# Patient Record
Sex: FEMALE | Race: BLACK OR AFRICAN AMERICAN | NOT HISPANIC OR LATINO | URBAN - METROPOLITAN AREA
[De-identification: names, ages, dates, MRNs, and addresses within clinical notes are randomized per-mention and may not be internally consistent; named-entity substitution may affect disease eponyms.]

---

## 2019-05-11 ENCOUNTER — EMERGENCY (EMERGENCY)
Facility: HOSPITAL | Age: 39
LOS: 0 days | Discharge: HOME | End: 2019-05-11
Payer: COMMERCIAL

## 2019-05-11 VITALS
DIASTOLIC BLOOD PRESSURE: 74 MMHG | SYSTOLIC BLOOD PRESSURE: 122 MMHG | OXYGEN SATURATION: 100 % | TEMPERATURE: 99 F | HEIGHT: 66 IN | RESPIRATION RATE: 18 BRPM | WEIGHT: 229.94 LBS | HEART RATE: 76 BPM
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
## 2019-05-11 DIAGNOSIS — S69.92XA UNSPECIFIED INJURY OF LEFT WRIST, HAND AND FINGER(S), INITIAL ENCOUNTER: ICD-10-CM
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
## 2019-05-11 DIAGNOSIS — S60.922A UNSPECIFIED SUPERFICIAL INJURY OF LEFT HAND, INITIAL ENCOUNTER: ICD-10-CM

## 2019-05-11 LAB
ALBUMIN SERPL ELPH-MCNC: 3.7 G/DL — SIGNIFICANT CHANGE UP (ref 3.3–5)
ALP SERPL-CCNC: 83 U/L — SIGNIFICANT CHANGE UP (ref 40–120)
ALT FLD-CCNC: 20 U/L — SIGNIFICANT CHANGE UP (ref 12–78)
ANION GAP SERPL CALC-SCNC: 9 MMOL/L — SIGNIFICANT CHANGE UP (ref 5–17)
AST SERPL-CCNC: 18 U/L — SIGNIFICANT CHANGE UP (ref 15–37)
BILIRUB SERPL-MCNC: 0.3 MG/DL — SIGNIFICANT CHANGE UP (ref 0.2–1.2)
BUN SERPL-MCNC: 11 MG/DL — SIGNIFICANT CHANGE UP (ref 7–23)
CALCIUM SERPL-MCNC: 9 MG/DL — SIGNIFICANT CHANGE UP (ref 8.5–10.1)
CHLORIDE SERPL-SCNC: 108 MMOL/L — SIGNIFICANT CHANGE UP (ref 96–108)
CO2 SERPL-SCNC: 25 MMOL/L — SIGNIFICANT CHANGE UP (ref 22–31)
CREAT SERPL-MCNC: 0.87 MG/DL — SIGNIFICANT CHANGE UP (ref 0.5–1.3)
GLUCOSE SERPL-MCNC: 78 MG/DL — SIGNIFICANT CHANGE UP (ref 70–99)
HCT VFR BLD CALC: 40.8 % — SIGNIFICANT CHANGE UP (ref 34.5–45)
HGB BLD-MCNC: 13.3 G/DL — SIGNIFICANT CHANGE UP (ref 11.5–15.5)
HIV 1 & 2 AB SERPL IA.RAPID: SIGNIFICANT CHANGE UP
MCHC RBC-ENTMCNC: 27 PG — SIGNIFICANT CHANGE UP (ref 27–34)
MCHC RBC-ENTMCNC: 32.6 GM/DL — SIGNIFICANT CHANGE UP (ref 32–36)
MCV RBC AUTO: 82.9 FL — SIGNIFICANT CHANGE UP (ref 80–100)
NRBC # BLD: 0 /100 WBCS — SIGNIFICANT CHANGE UP (ref 0–0)
PLATELET # BLD AUTO: 366 K/UL — SIGNIFICANT CHANGE UP (ref 150–400)
POTASSIUM SERPL-MCNC: 3.7 MMOL/L — SIGNIFICANT CHANGE UP (ref 3.5–5.3)
POTASSIUM SERPL-SCNC: 3.7 MMOL/L — SIGNIFICANT CHANGE UP (ref 3.5–5.3)
PROT SERPL-MCNC: 7.3 GM/DL — SIGNIFICANT CHANGE UP (ref 6–8.3)
RBC # BLD: 4.92 M/UL — SIGNIFICANT CHANGE UP (ref 3.8–5.2)
RBC # FLD: 14.5 % — SIGNIFICANT CHANGE UP (ref 10.3–14.5)
SODIUM SERPL-SCNC: 142 MMOL/L — SIGNIFICANT CHANGE UP (ref 135–145)
WBC # BLD: 6.87 K/UL — SIGNIFICANT CHANGE UP (ref 3.8–10.5)
WBC # FLD AUTO: 6.87 K/UL — SIGNIFICANT CHANGE UP (ref 3.8–10.5)

## 2019-05-11 PROCEDURE — 99283 EMERGENCY DEPT VISIT LOW MDM: CPT

## 2019-05-11 RX ORDER — EMTRICITABINE AND TENOFOVIR DISOPROXIL FUMARATE 200; 300 MG/1; MG/1
1 TABLET, FILM COATED ORAL
Qty: 30 | Refills: 0
Start: 2019-05-11 | End: 2019-06-09

## 2019-05-11 RX ORDER — RALTEGRAVIR 400 MG/1
1 TABLET, FILM COATED ORAL
Qty: 60 | Refills: 0
Start: 2019-05-11 | End: 2019-06-09

## 2019-05-11 RX ORDER — EMTRICITABINE AND TENOFOVIR DISOPROXIL FUMARATE 200; 300 MG/1; MG/1
1 TABLET, FILM COATED ORAL ONCE
Refills: 0 | Status: COMPLETED | OUTPATIENT
Start: 2019-05-11 | End: 2019-05-11

## 2019-05-11 RX ORDER — RALTEGRAVIR 400 MG/1
400 TABLET, FILM COATED ORAL ONCE
Refills: 0 | Status: COMPLETED | OUTPATIENT
Start: 2019-05-11 | End: 2019-05-11

## 2019-05-11 RX ADMIN — RALTEGRAVIR 400 MILLIGRAM(S): 400 TABLET, FILM COATED ORAL at 20:57

## 2019-05-11 RX ADMIN — EMTRICITABINE AND TENOFOVIR DISOPROXIL FUMARATE 1 TABLET(S): 200; 300 TABLET, FILM COATED ORAL at 20:57

## 2019-05-11 NOTE — ED PROVIDER NOTE - OBJECTIVE STATEMENT
L thumb needlestick 3 days ago at work 38 y/o c/o L thumb needlestick 3 days ago at work. Pt is a PA, was given perivertebral injection for pain management when the pt jumped and her L thumb came in contact with the used needle. Pt reports no gross blood on syringe, believes it was a 25 g needle. + small amt of blood on finger when gloves was removed. Reports cleaning out the wound. Was sent in by her supervisor for eval. Pt reports that the source pt was contacted and is being tested. Reports that the source pt has no known h/o HIV, hep, etc.

## 2019-05-11 NOTE — ED ADULT NURSE NOTE - OBJECTIVE STATEMENT
patient A&Ox3 in no acute distress , patient got a needle stick on Wednesday at work , she was send for evaluation by her supervisor, patient denied any pain or disc at this time,

## 2019-05-11 NOTE — ED PROVIDER NOTE - CLINICAL SUMMARY MEDICAL DECISION MAKING FREE TEXT BOX
pt sustained needlestick injury to L thumb with used needle at work a few days ago. Pt was sent for eval. Pt consented to labs, and after discussing risk and benefits, wanted PEP. discussed HIV testing and repeat testing for HIV and hep. Pt elected to f/u results with her DR or throught medical records, did not want to wait for results. Follow up with your regular Dr or clinic above in 2 days. Return to ER if you feel worse, or have new symptoms.

## 2019-05-11 NOTE — ED ADULT TRIAGE NOTE - CHIEF COMPLAINT QUOTE
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

## 2019-05-11 NOTE — ED PROVIDER NOTE - PROGRESS NOTE DETAILS
Pt did not want to wait for lab results. discussed HIV testing and confidentially earlier, will f/u her own results with Medical records or her PMD.

## 2019-05-12 LAB
CHOLEST SERPL-MCNC: 165 MG/DL — SIGNIFICANT CHANGE UP (ref 10–199)
HBV CORE AB SER-ACNC: SIGNIFICANT CHANGE UP
HDLC SERPL-MCNC: 41 MG/DL — LOW
LIPID PNL WITH DIRECT LDL SERPL: 103 MG/DL — HIGH
TOTAL CHOLESTEROL/HDL RATIO MEASUREMENT: 4 RATIO — SIGNIFICANT CHANGE UP (ref 3.3–7.1)
TRIGL SERPL-MCNC: 106 MG/DL — SIGNIFICANT CHANGE UP (ref 10–149)

## 2019-05-22 LAB
HCV AB S/CO SERPL IA: 0.06 S/CO — SIGNIFICANT CHANGE UP (ref 0–0.99)
HCV AB SERPL-IMP: SIGNIFICANT CHANGE UP

## 2020-07-20 PROBLEM — J45.909 UNSPECIFIED ASTHMA, UNCOMPLICATED: Chronic | Status: ACTIVE | Noted: 2019-05-11

## 2020-08-19 PROBLEM — Z00.00 ENCOUNTER FOR PREVENTIVE HEALTH EXAMINATION: Status: ACTIVE | Noted: 2020-08-19

## 2020-09-12 ENCOUNTER — RESULT REVIEW (OUTPATIENT)
Age: 40
End: 2020-09-12

## 2020-09-12 ENCOUNTER — APPOINTMENT (OUTPATIENT)
Dept: ULTRASOUND IMAGING | Facility: IMAGING CENTER | Age: 40
End: 2020-09-12
Payer: COMMERCIAL

## 2020-09-12 ENCOUNTER — OUTPATIENT (OUTPATIENT)
Dept: OUTPATIENT SERVICES | Facility: HOSPITAL | Age: 40
LOS: 1 days | End: 2020-09-12
Payer: COMMERCIAL

## 2020-09-12 DIAGNOSIS — Z00.8 ENCOUNTER FOR OTHER GENERAL EXAMINATION: ICD-10-CM

## 2020-09-12 PROCEDURE — 88305 TISSUE EXAM BY PATHOLOGIST: CPT | Mod: 26

## 2020-09-12 PROCEDURE — 19083 BX BREAST 1ST LESION US IMAG: CPT | Mod: LT

## 2020-09-12 PROCEDURE — 77066 DX MAMMO INCL CAD BI: CPT

## 2020-09-12 PROCEDURE — A4648: CPT

## 2020-09-12 PROCEDURE — 77066 DX MAMMO INCL CAD BI: CPT | Mod: 26

## 2020-09-12 PROCEDURE — 19084 BX BREAST ADD LESION US IMAG: CPT

## 2020-09-12 PROCEDURE — 88305 TISSUE EXAM BY PATHOLOGIST: CPT

## 2020-09-12 PROCEDURE — 19084 BX BREAST ADD LESION US IMAG: CPT | Mod: RT

## 2020-09-12 PROCEDURE — 19083 BX BREAST 1ST LESION US IMAG: CPT

## 2020-09-14 LAB — SURGICAL PATHOLOGY STUDY: SIGNIFICANT CHANGE UP

## 2022-10-12 ENCOUNTER — RESULT REVIEW (OUTPATIENT)
Age: 42
End: 2022-10-12

## 2022-10-14 ENCOUNTER — ASOB RESULT (OUTPATIENT)
Age: 42
End: 2022-10-14

## 2022-10-14 ENCOUNTER — APPOINTMENT (OUTPATIENT)
Dept: ANTEPARTUM | Facility: CLINIC | Age: 42
End: 2022-10-14

## 2022-10-14 DIAGNOSIS — O35.1XX0 MATERNAL CARE FOR (SUSPECTED) CHROMOSOMAL ABNORMALITY IN FETUS, NOT APPLICABLE OR UNSPECIFIED: ICD-10-CM

## 2022-10-14 PROCEDURE — 36415 COLL VENOUS BLD VENIPUNCTURE: CPT

## 2022-10-14 PROCEDURE — 76813 OB US NUCHAL MEAS 1 GEST: CPT

## 2022-10-17 LAB
ADDITIONAL US: NORMAL
COMMENTS: AFP: NORMAL
CRL SCAN TWIN B: NORMAL
CRL SCAN: NORMAL
CROWN RUMP LENGTH TWIN B: NORMAL
CROWN RUMP LENGTH: 48.8 MM
DOWN SYNDROME AGE RISK: NORMAL
DOWN SYNDROME INTERPRETATION: NORMAL
DOWN SYNDROME SCREENING RISK: NORMAL
GEST. AGE ON COLLECTION DATE: 11.4 WEEKS
HCG MOM: 2.26
HCG VALUE: 193.2 IU/ML
MATERNAL AGE AT EDD: 44.2 YR
NOTE: AFP: NORMAL
NT MOM TWIN B: NORMAL
NT TWIN B: NORMAL
NUCHAL TRANSLUCENCY (NT): 1 MM
NUCHAL TRANSLUCENCY MOM: 0.79
NUMBER OF FETUSES: 1
PAPP-A MOM: 1.36
PAPP-A VALUE: 486.5 NG/ML
RACE: NORMAL
RESULTS AFP: NORMAL
SONOGRAPHER ID#: NORMAL
SUBMIT PART 2 SAMPLE USING: NORMAL
TEST RESULTS: AFP: NORMAL
TRISOMY 18 AGE RISK: NORMAL
TRISOMY 18 INTERPRETATION: NORMAL
TRISOMY 18 SCREENING RISK: NORMAL
WEIGHT AFP: 235 LBS

## 2022-10-19 ENCOUNTER — APPOINTMENT (OUTPATIENT)
Dept: OBGYN | Facility: CLINIC | Age: 42
End: 2022-10-19

## 2022-12-14 ENCOUNTER — APPOINTMENT (OUTPATIENT)
Dept: ANTEPARTUM | Facility: CLINIC | Age: 42
End: 2022-12-14

## 2022-12-14 ENCOUNTER — ASOB RESULT (OUTPATIENT)
Age: 42
End: 2022-12-14

## 2022-12-14 PROCEDURE — 76817 TRANSVAGINAL US OBSTETRIC: CPT

## 2022-12-14 PROCEDURE — 76811 OB US DETAILED SNGL FETUS: CPT

## 2022-12-28 ENCOUNTER — ASOB RESULT (OUTPATIENT)
Age: 42
End: 2022-12-28

## 2022-12-28 ENCOUNTER — APPOINTMENT (OUTPATIENT)
Dept: ANTEPARTUM | Facility: CLINIC | Age: 42
End: 2022-12-28
Payer: COMMERCIAL

## 2022-12-28 PROCEDURE — 76816 OB US FOLLOW-UP PER FETUS: CPT

## 2023-02-22 ENCOUNTER — ASOB RESULT (OUTPATIENT)
Age: 43
End: 2023-02-22

## 2023-02-22 ENCOUNTER — APPOINTMENT (OUTPATIENT)
Dept: ANTEPARTUM | Facility: CLINIC | Age: 43
End: 2023-02-22
Payer: COMMERCIAL

## 2023-02-22 PROCEDURE — 76819 FETAL BIOPHYS PROFIL W/O NST: CPT

## 2023-02-22 PROCEDURE — 76816 OB US FOLLOW-UP PER FETUS: CPT

## 2023-03-29 ENCOUNTER — APPOINTMENT (OUTPATIENT)
Dept: ANTEPARTUM | Facility: CLINIC | Age: 43
End: 2023-03-29
Payer: COMMERCIAL

## 2023-03-29 ENCOUNTER — ASOB RESULT (OUTPATIENT)
Age: 43
End: 2023-03-29

## 2023-03-29 PROCEDURE — 76816 OB US FOLLOW-UP PER FETUS: CPT

## 2023-03-29 PROCEDURE — ZZZZZ: CPT

## 2023-03-29 PROCEDURE — 76818 FETAL BIOPHYS PROFILE W/NST: CPT

## 2023-04-04 ENCOUNTER — ASOB RESULT (OUTPATIENT)
Age: 43
End: 2023-04-04

## 2023-04-04 ENCOUNTER — APPOINTMENT (OUTPATIENT)
Dept: ANTEPARTUM | Facility: CLINIC | Age: 43
End: 2023-04-04

## 2023-04-04 ENCOUNTER — APPOINTMENT (OUTPATIENT)
Dept: ANTEPARTUM | Facility: CLINIC | Age: 43
End: 2023-04-04
Payer: COMMERCIAL

## 2023-04-04 ENCOUNTER — OUTPATIENT (OUTPATIENT)
Dept: INPATIENT UNIT | Facility: HOSPITAL | Age: 43
LOS: 1 days | Discharge: HOME | End: 2023-04-04
Payer: COMMERCIAL

## 2023-04-04 VITALS — TEMPERATURE: 99 F

## 2023-04-04 VITALS — HEART RATE: 90 BPM | OXYGEN SATURATION: 100 %

## 2023-04-04 DIAGNOSIS — O26.899 OTHER SPECIFIED PREGNANCY RELATED CONDITIONS, UNSPECIFIED TRIMESTER: ICD-10-CM

## 2023-04-04 LAB
AMNISURE ROM (RUPTURE OF MEMBRANES): NEGATIVE — SIGNIFICANT CHANGE UP
BASOPHILS # BLD AUTO: 0.04 K/UL — SIGNIFICANT CHANGE UP (ref 0–0.2)
BASOPHILS NFR BLD AUTO: 0.4 % — SIGNIFICANT CHANGE UP (ref 0–2)
BLD GP AB SCN SERPL QL: NEGATIVE — SIGNIFICANT CHANGE UP
EOSINOPHIL # BLD AUTO: 0.21 K/UL — SIGNIFICANT CHANGE UP (ref 0–0.5)
EOSINOPHIL NFR BLD AUTO: 2.3 % — SIGNIFICANT CHANGE UP (ref 0–6)
HCT VFR BLD CALC: 38.7 % — SIGNIFICANT CHANGE UP (ref 34.5–45)
HGB BLD-MCNC: 12.5 G/DL — SIGNIFICANT CHANGE UP (ref 11.5–15.5)
IANC: 7.08 K/UL — SIGNIFICANT CHANGE UP (ref 1.8–7.4)
IMM GRANULOCYTES NFR BLD AUTO: 0.6 % — SIGNIFICANT CHANGE UP (ref 0–0.9)
LYMPHOCYTES # BLD AUTO: 1.1 K/UL — SIGNIFICANT CHANGE UP (ref 1–3.3)
LYMPHOCYTES # BLD AUTO: 11.8 % — LOW (ref 13–44)
MCHC RBC-ENTMCNC: 26 PG — LOW (ref 27–34)
MCHC RBC-ENTMCNC: 32.3 GM/DL — SIGNIFICANT CHANGE UP (ref 32–36)
MCV RBC AUTO: 80.6 FL — SIGNIFICANT CHANGE UP (ref 80–100)
MONOCYTES # BLD AUTO: 0.83 K/UL — SIGNIFICANT CHANGE UP (ref 0–0.9)
MONOCYTES NFR BLD AUTO: 8.9 % — SIGNIFICANT CHANGE UP (ref 2–14)
NEUTROPHILS # BLD AUTO: 7.08 K/UL — SIGNIFICANT CHANGE UP (ref 1.8–7.4)
NEUTROPHILS NFR BLD AUTO: 76 % — SIGNIFICANT CHANGE UP (ref 43–77)
NRBC # BLD: 0 /100 WBCS — SIGNIFICANT CHANGE UP (ref 0–0)
NRBC # FLD: 0 K/UL — SIGNIFICANT CHANGE UP (ref 0–0)
PLATELET # BLD AUTO: 247 K/UL — SIGNIFICANT CHANGE UP (ref 150–400)
RBC # BLD: 4.8 M/UL — SIGNIFICANT CHANGE UP (ref 3.8–5.2)
RBC # FLD: 17 % — HIGH (ref 10.3–14.5)
RH IG SCN BLD-IMP: POSITIVE — SIGNIFICANT CHANGE UP
SARS-COV-2 RNA SPEC QL NAA+PROBE: SIGNIFICANT CHANGE UP
WBC # BLD: 9.32 K/UL — SIGNIFICANT CHANGE UP (ref 3.8–10.5)
WBC # FLD AUTO: 9.32 K/UL — SIGNIFICANT CHANGE UP (ref 3.8–10.5)

## 2023-04-04 PROCEDURE — 76818 FETAL BIOPHYS PROFILE W/NST: CPT

## 2023-04-04 PROCEDURE — 83986 ASSAY PH BODY FLUID NOS: CPT | Mod: QW

## 2023-04-04 PROCEDURE — 99221 1ST HOSP IP/OBS SF/LOW 40: CPT | Mod: 25

## 2023-04-04 RX ORDER — ALBUTEROL 90 UG/1
3 AEROSOL, METERED ORAL
Qty: 0 | Refills: 0 | DISCHARGE

## 2023-04-04 RX ORDER — SODIUM CHLORIDE 9 MG/ML
1000 INJECTION, SOLUTION INTRAVENOUS ONCE
Refills: 0 | Status: DISCONTINUED | OUTPATIENT
Start: 2023-04-04 | End: 2023-04-19

## 2023-04-04 RX ORDER — FEXOFENADINE HCL 30 MG
1 TABLET ORAL
Qty: 0 | Refills: 0 | DISCHARGE

## 2023-04-04 NOTE — OB PROVIDER TRIAGE NOTE - NSOBPROVIDERNOTE_OBGYN_ALL_OB_FT
a/p: 43 y.o.  HIRA 2023 @ 37 weeks     1755  Discussed with Dr Ottoniel Israel. Plan for RL 1L IVB x 1, amnisure    AIDAidtim, NP a/p: 43 y.o.  HIRA 2023 @ 37 weeks r/o ROM    6815  Discussed with Dr Ottoniel Israel. Plan for RL 1L IVB x 1, amnisure pending, NPO  plan of care reviewed with patient and patient partner    Isela, NP a/p: 43 y.o.  HIRA 2023 @ 37 weeks r/o ROM    GBS negative 3/27/23    4481  Discussed with Dr Ottoniel Israel. Plan for RL 1L IVB x 1, amnisure pending, NPO  plan of care reviewed with patient and patient partner    Isela, NP a/p: 43 y.o.  HIRA 2023 @ 37 weeks r/o ROM    GBS negative 3/27/23    1145  Discussed with Dr Ottoniel Israel. Plan for RL 1L IVB x 1, amnisure pending, NPO  plan of care reviewed with patient and patient partner      amnisure negative, results reviewed with patient  RL 1L bolus continues    JMidy, NP a/p: 43 y.o.  HIRA 2023 @ 37 weeks r/o ROM    GBS negative 3/27/23    1755  Discussed with Dr Ottoniel Israel. Plan for RL 1L IVB x 1, amnisure pending, NPO  plan of care reviewed with patient and patient partner      amnisure negative, results reviewed with patient  RL 1L bolus continues    JMidy, NP    :   Tracing reviewed by Dr Briggs  Pt is cleared for discharge  Dr Briggs at patient's bedside to discuss follow-ups    Discharge home with instructions  Labor precautions  Pt to monitor fetal kick counts  pt to follow up with OB as instructed  Pt instructed to increase PO hydration  Discharge time:

## 2023-04-04 NOTE — OB PROVIDER TRIAGE NOTE - NS_OBGYNHISTORY_OBGYN_ALL_OB_FT
2013  7lbs F  2017  7lbs M  fibroid uterus 2013  7lbs F  2017  7lbs M  fibroid uterus: right lateral myoma 7 x 5 x 6

## 2023-04-04 NOTE — OB RN TRIAGE NOTE - FALL HARM RISK - UNIVERSAL INTERVENTIONS
Bed in lowest position, wheels locked, appropriate side rails in place/Call bell, personal items and telephone in reach/Instruct patient to call for assistance before getting out of bed or chair/Non-slip footwear when patient is out of bed/[place] to call system/Physically safe environment - no spills, clutter or unnecessary equipment/Purposeful Proactive Rounding/Room/bathroom lighting operational, light cord in reach

## 2023-04-04 NOTE — OB RN TRIAGE NOTE - NSNURSINGINSTR_OBGYN_ALL_OB_FT
Patient discharged home with instructions.  Patient verbalizes understanding. All questions answered.

## 2023-04-04 NOTE — OB PROVIDER TRIAGE NOTE - NSHPPHYSICALEXAM_GEN_ALL_CORE
abdomen soft, nontender ICU Vital Signs Last 24 Hrs  T(C): 37 (04 Apr 2023 16:56), Max: 37.0 (04 Apr 2023 16:52)  T(F): 98.6 (04 Apr 2023 16:56), Max: 98.6 (04 Apr 2023 16:52)  HR: 98 (04 Apr 2023 18:26) (90 - 110)  BP: 124/65 (04 Apr 2023 17:12) (118/68 - 127/65)  BP(mean): --  ABP: --  ABP(mean): --  RR: 16 (04 Apr 2023 16:56) (16 - 16)  SpO2: 100% (04 Apr 2023 18:26) (97% - 100%)      abdomen soft, nontender  taus: sliup, galdino breech presentation, posterior placenta, BPP 8/8, afua 10.26  sse: negative pooling/nitrazine/ferning, amnisure pending  sve: 0/50/-3/I  nst in progress

## 2023-04-04 NOTE — OB PROVIDER TRIAGE NOTE - ADDITIONAL INSTRUCTIONS
Discharge home with instructions  Labor precautions  Pt to monitor fetal kick counts  pt to follow up with OB as instructed  Pt instructed to increase PO hydration

## 2023-04-04 NOTE — OB PROVIDER TRIAGE NOTE - HISTORY OF PRESENT ILLNESS
43 y.o.  HIRA 2023 @ 37 weeks presents from [facility] office for evaluation of decreased afua of 5.5. Pt denies LOF, VB, ctx. States +FM. Fetus is breech presentation and patient is scheduled for pLTCS 2023.    pt states hx +covid 19 . Pt is vaccinated x 2 with moderna covid 19 vaccine and moderna covid 19 booster vaccine x 1.    allergies:  NKDA  medications:  prenatal vitamins  asa 81 mg PO alternating with asa 162 mg PO daily - last dose 162 mg 4/3/23  albuterol prn    med hx:  BMI 40  asthma - mild, intermittent, never hospitalized or intubated, albuterol prn last use 1 yr ago  hypothyroid- no meds    surg hx:  denies    OBGYN hx:  2013  7lbs F  2017  7lbs M  fibroid uterus    psychosocial hx:  anxiety, depression     ETOH/tobacco/illicit drug use:  denies   43 y.o.  HIRA 2023 @ 37 weeks presents from [facility] office for evaluation of decreased afua of 5.5. Pt denies LOF, VB, ctx. States +FM. Fetus is breech presentation and patient is scheduled for pLTCS 2023.    [facility] ultrasound [address]: sliup, bpp , afua 5.5, breech presentation, posterior placenta    pt states hx +covid 19 . Pt is vaccinated x 2 with moderna covid 19 vaccine and moderna covid 19 booster vaccine x 1.    allergies:  NKDA  medications:  prenatal vitamins  asa 81 mg PO alternating with asa 162 mg PO daily - last dose 162 mg 4/3/23  albuterol prn    med hx:  BMI 40  asthma - mild, intermittent, never hospitalized or intubated, albuterol prn last use 1 yr ago  hypothyroid- no meds    surg hx:  denies    OBGYN hx:  2013  7lbs F  2017  7lbs M  fibroid uterus: right lateral myoma 7 x 5 x 6    psychosocial hx:  anxiety, depression     ETOH/tobacco/illicit drug use:  denies

## 2023-04-04 NOTE — CHART NOTE - NSCHARTNOTEFT_GEN_A_CORE
Attending Note     Pt reports good FM, no LOF/VB/CTX  Amnisure neg  Repeat MARQUES 10 and NST reviewed with MFM, will d/c home with f/u in 48 hours   Strict precautions reviewed    R Brigitte HARRISON

## 2023-04-05 LAB
COVID-19 SPIKE DOMAIN AB INTERP: POSITIVE
COVID-19 SPIKE DOMAIN ANTIBODY RESULT: >250 U/ML — HIGH
SARS-COV-2 IGG+IGM SERPL QL IA: >250 U/ML — HIGH
SARS-COV-2 IGG+IGM SERPL QL IA: POSITIVE
T PALLIDUM AB TITR SER: NEGATIVE — SIGNIFICANT CHANGE UP

## 2023-04-06 DIAGNOSIS — O09.523 SUPERVISION OF ELDERLY MULTIGRAVIDA, THIRD TRIMESTER: ICD-10-CM

## 2023-04-06 DIAGNOSIS — O99.283 ENDOCRINE, NUTRITIONAL AND METABOLIC DISEASES COMPLICATING PREGNANCY, THIRD TRIMESTER: ICD-10-CM

## 2023-04-06 DIAGNOSIS — F41.9 ANXIETY DISORDER, UNSPECIFIED: ICD-10-CM

## 2023-04-06 DIAGNOSIS — Z86.16 PERSONAL HISTORY OF COVID-19: ICD-10-CM

## 2023-04-06 DIAGNOSIS — O99.513 DISEASES OF THE RESPIRATORY SYSTEM COMPLICATING PREGNANCY, THIRD TRIMESTER: ICD-10-CM

## 2023-04-06 DIAGNOSIS — Z20.822 CONTACT WITH AND (SUSPECTED) EXPOSURE TO COVID-19: ICD-10-CM

## 2023-04-06 DIAGNOSIS — Z03.71 ENCOUNTER FOR SUSPECTED PROBLEM WITH AMNIOTIC CAVITY AND MEMBRANE RULED OUT: ICD-10-CM

## 2023-04-06 DIAGNOSIS — Z3A.37 37 WEEKS GESTATION OF PREGNANCY: ICD-10-CM

## 2023-04-06 DIAGNOSIS — O34.13 MATERNAL CARE FOR BENIGN TUMOR OF CORPUS UTERI, THIRD TRIMESTER: ICD-10-CM

## 2023-04-06 DIAGNOSIS — J45.20 MILD INTERMITTENT ASTHMA, UNCOMPLICATED: ICD-10-CM

## 2023-04-06 DIAGNOSIS — E03.9 HYPOTHYROIDISM, UNSPECIFIED: ICD-10-CM

## 2023-04-06 DIAGNOSIS — D25.9 LEIOMYOMA OF UTERUS, UNSPECIFIED: ICD-10-CM

## 2023-04-06 DIAGNOSIS — O99.343 OTHER MENTAL DISORDERS COMPLICATING PREGNANCY, THIRD TRIMESTER: ICD-10-CM

## 2023-04-07 ENCOUNTER — ASOB RESULT (OUTPATIENT)
Age: 43
End: 2023-04-07

## 2023-04-07 ENCOUNTER — APPOINTMENT (OUTPATIENT)
Dept: ANTEPARTUM | Facility: CLINIC | Age: 43
End: 2023-04-07
Payer: COMMERCIAL

## 2023-04-07 PROCEDURE — 76818 FETAL BIOPHYS PROFILE W/NST: CPT

## 2023-04-11 ENCOUNTER — APPOINTMENT (OUTPATIENT)
Dept: ANTEPARTUM | Facility: CLINIC | Age: 43
End: 2023-04-11
Payer: COMMERCIAL

## 2023-04-11 ENCOUNTER — ASOB RESULT (OUTPATIENT)
Age: 43
End: 2023-04-11

## 2023-04-11 PROCEDURE — ZZZZZ: CPT

## 2023-04-11 PROCEDURE — 76818 FETAL BIOPHYS PROFILE W/NST: CPT

## 2023-04-14 ENCOUNTER — OUTPATIENT (OUTPATIENT)
Dept: OUTPATIENT SERVICES | Facility: HOSPITAL | Age: 43
LOS: 1 days | End: 2023-04-14

## 2023-04-14 VITALS
TEMPERATURE: 97 F | HEIGHT: 65.5 IN | OXYGEN SATURATION: 99 % | DIASTOLIC BLOOD PRESSURE: 74 MMHG | SYSTOLIC BLOOD PRESSURE: 111 MMHG | HEART RATE: 90 BPM | RESPIRATION RATE: 18 BRPM | WEIGHT: 251.99 LBS

## 2023-04-14 LAB
APPEARANCE UR: ABNORMAL
BACTERIA # UR AUTO: ABNORMAL
BILIRUB UR-MCNC: NEGATIVE — SIGNIFICANT CHANGE UP
BLD GP AB SCN SERPL QL: NEGATIVE — SIGNIFICANT CHANGE UP
COLOR SPEC: SIGNIFICANT CHANGE UP
DIFF PNL FLD: NEGATIVE — SIGNIFICANT CHANGE UP
EPI CELLS # UR: 5 /HPF — SIGNIFICANT CHANGE UP (ref 0–5)
GLUCOSE UR QL: NEGATIVE — SIGNIFICANT CHANGE UP
HCT VFR BLD CALC: 36.5 % — SIGNIFICANT CHANGE UP (ref 34.5–45)
HGB BLD-MCNC: 11.8 G/DL — SIGNIFICANT CHANGE UP (ref 11.5–15.5)
HIV 1+2 AB+HIV1 P24 AG SERPL QL IA: SIGNIFICANT CHANGE UP
HYALINE CASTS # UR AUTO: 1 /LPF — SIGNIFICANT CHANGE UP (ref 0–7)
KETONES UR-MCNC: NEGATIVE — SIGNIFICANT CHANGE UP
LEUKOCYTE ESTERASE UR-ACNC: ABNORMAL
MCHC RBC-ENTMCNC: 25.4 PG — LOW (ref 27–34)
MCHC RBC-ENTMCNC: 32.3 GM/DL — SIGNIFICANT CHANGE UP (ref 32–36)
MCV RBC AUTO: 78.7 FL — LOW (ref 80–100)
NITRITE UR-MCNC: NEGATIVE — SIGNIFICANT CHANGE UP
NRBC # BLD: 0 /100 WBCS — SIGNIFICANT CHANGE UP (ref 0–0)
NRBC # FLD: 0 K/UL — SIGNIFICANT CHANGE UP (ref 0–0)
PH UR: 6.5 — SIGNIFICANT CHANGE UP (ref 5–8)
PLATELET # BLD AUTO: 264 K/UL — SIGNIFICANT CHANGE UP (ref 150–400)
PROT UR-MCNC: NEGATIVE — SIGNIFICANT CHANGE UP
RBC # BLD: 4.64 M/UL — SIGNIFICANT CHANGE UP (ref 3.8–5.2)
RBC # FLD: 17 % — HIGH (ref 10.3–14.5)
RBC CASTS # UR COMP ASSIST: 1 /HPF — SIGNIFICANT CHANGE UP (ref 0–4)
RH IG SCN BLD-IMP: POSITIVE — SIGNIFICANT CHANGE UP
SP GR SPEC: 1.01 — LOW (ref 1.01–1.05)
UROBILINOGEN FLD QL: SIGNIFICANT CHANGE UP
WBC # BLD: 6.93 K/UL — SIGNIFICANT CHANGE UP (ref 3.8–10.5)
WBC # FLD AUTO: 6.93 K/UL — SIGNIFICANT CHANGE UP (ref 3.8–10.5)
WBC UR QL: 36 /HPF — HIGH (ref 0–5)

## 2023-04-14 RX ORDER — SODIUM CHLORIDE 9 MG/ML
1000 INJECTION, SOLUTION INTRAVENOUS
Refills: 0 | Status: DISCONTINUED | OUTPATIENT
Start: 2023-04-21 | End: 2023-04-21

## 2023-04-14 NOTE — OB PST NOTE - HISTORY OF PRESENT ILLNESS
42 y/o f 38 weeks , , HIRA 23  Denies abn. vaginal bleeding or leaking  + FM during PST 44 y/o female 38 weeks pregnant, , HIRA 23 presents to PST for pre op evaluation in preparation for primary  section due to Breech presentation.  Pt denies abn. vaginal bleeding or leaking  Pt reports + FM during PST

## 2023-04-14 NOTE — OB PST NOTE - NSHPPHYSICALEXAM_GEN_ALL_CORE
Constitutional: Well Developed, No Distress    Eyes: PERRL, EOMI, conjunctiva clear    Ears: Normal    Mouth & Gums: Normal, moist    Neck: + thyroid masses  Supple, no JVD    Breast: Normal shape    Back: Normal shape    Respiratory: Airway patent, breath sounds equal, good air movement, respiration non-labored, clear to auscultation bilateral, no chest wall tenderness, no intercostal retractions, no rales, no wheezes, no rhonchi    Cardiovascular:  Regular rate and rhythm, no rubs or murmur, normal PMI    Gastrointestinal: Soft, + BS  Extremities: No clubbing, cyanosis, or pedal edema    Vascular:  DP pulse normal    Neurological: alert & oriented x 3, sensation intact, deep reflexes intact, cranial nerve intact, normal strength    Skin: warm and dry, normal color    Lymph Nodes: normal posterior cervical lymph node, normal anterior cervical lymph node, normal supraclavicular lymph node    Musculoskeletal: No edema  ROM intact, warmth, or calf tenderness. Normal strength    Psychiatric: normal affect, normal behavior

## 2023-04-14 NOTE — OB PST NOTE - NSANTHOSAYNRD_GEN_A_CORE
No. KALEE screening performed.  STOP BANG Legend: 0-2 = LOW Risk; 3-4 = INTERMEDIATE Risk; 5-8 = HIGH Risk

## 2023-04-14 NOTE — OB PST NOTE - PROBLEM SELECTOR PLAN 1
Schedule for primary  section - Breech presentation tentatively on 23. Pre op instructions, chlorhexidine gluconate soap given and explained. Pt verbalized understanding. Covid 19 PCR ordered

## 2023-04-14 NOTE — OB PST NOTE - NSHPREVIEWOFSYSTEMS_GEN_ALL_CORE

## 2023-04-18 ENCOUNTER — APPOINTMENT (OUTPATIENT)
Dept: ANTEPARTUM | Facility: CLINIC | Age: 43
End: 2023-04-18
Payer: COMMERCIAL

## 2023-04-18 ENCOUNTER — ASOB RESULT (OUTPATIENT)
Age: 43
End: 2023-04-18

## 2023-04-18 PROCEDURE — 76818 FETAL BIOPHYS PROFILE W/NST: CPT

## 2023-04-20 ENCOUNTER — TRANSCRIPTION ENCOUNTER (OUTPATIENT)
Age: 43
End: 2023-04-20

## 2023-04-21 ENCOUNTER — TRANSCRIPTION ENCOUNTER (OUTPATIENT)
Age: 43
End: 2023-04-21

## 2023-04-21 ENCOUNTER — INPATIENT (INPATIENT)
Facility: HOSPITAL | Age: 43
LOS: 2 days | Discharge: HOME | End: 2023-04-24
Attending: OBSTETRICS & GYNECOLOGY | Admitting: OBSTETRICS & GYNECOLOGY
Payer: COMMERCIAL

## 2023-04-21 VITALS — DIASTOLIC BLOOD PRESSURE: 73 MMHG | HEART RATE: 109 BPM | SYSTOLIC BLOOD PRESSURE: 115 MMHG

## 2023-04-21 LAB
BASOPHILS # BLD AUTO: 0.03 K/UL — SIGNIFICANT CHANGE UP (ref 0–0.2)
BASOPHILS NFR BLD AUTO: 0.4 % — SIGNIFICANT CHANGE UP (ref 0–2)
BLD GP AB SCN SERPL QL: NEGATIVE — SIGNIFICANT CHANGE UP
COVID-19 SPIKE DOMAIN AB INTERP: POSITIVE
COVID-19 SPIKE DOMAIN ANTIBODY RESULT: >250 U/ML — HIGH
EOSINOPHIL # BLD AUTO: 0.1 K/UL — SIGNIFICANT CHANGE UP (ref 0–0.5)
EOSINOPHIL NFR BLD AUTO: 1.5 % — SIGNIFICANT CHANGE UP (ref 0–6)
HCT VFR BLD CALC: 37.3 % — SIGNIFICANT CHANGE UP (ref 34.5–45)
HCT VFR BLD CALC: 38 % — SIGNIFICANT CHANGE UP (ref 34.5–45)
HGB BLD-MCNC: 12 G/DL — SIGNIFICANT CHANGE UP (ref 11.5–15.5)
HGB BLD-MCNC: 12.3 G/DL — SIGNIFICANT CHANGE UP (ref 11.5–15.5)
IANC: 4.93 K/UL — SIGNIFICANT CHANGE UP (ref 1.8–7.4)
IMM GRANULOCYTES NFR BLD AUTO: 0.9 % — SIGNIFICANT CHANGE UP (ref 0–0.9)
LYMPHOCYTES # BLD AUTO: 1.08 K/UL — SIGNIFICANT CHANGE UP (ref 1–3.3)
LYMPHOCYTES # BLD AUTO: 15.9 % — SIGNIFICANT CHANGE UP (ref 13–44)
MCHC RBC-ENTMCNC: 25.8 PG — LOW (ref 27–34)
MCHC RBC-ENTMCNC: 25.8 PG — LOW (ref 27–34)
MCHC RBC-ENTMCNC: 32.2 GM/DL — SIGNIFICANT CHANGE UP (ref 32–36)
MCHC RBC-ENTMCNC: 32.4 GM/DL — SIGNIFICANT CHANGE UP (ref 32–36)
MCV RBC AUTO: 79.8 FL — LOW (ref 80–100)
MCV RBC AUTO: 80.2 FL — SIGNIFICANT CHANGE UP (ref 80–100)
MONOCYTES # BLD AUTO: 0.6 K/UL — SIGNIFICANT CHANGE UP (ref 0–0.9)
MONOCYTES NFR BLD AUTO: 8.8 % — SIGNIFICANT CHANGE UP (ref 2–14)
NEUTROPHILS # BLD AUTO: 4.93 K/UL — SIGNIFICANT CHANGE UP (ref 1.8–7.4)
NEUTROPHILS NFR BLD AUTO: 72.5 % — SIGNIFICANT CHANGE UP (ref 43–77)
NRBC # BLD: 0 /100 WBCS — SIGNIFICANT CHANGE UP (ref 0–0)
NRBC # BLD: 0 /100 WBCS — SIGNIFICANT CHANGE UP (ref 0–0)
NRBC # FLD: 0 K/UL — SIGNIFICANT CHANGE UP (ref 0–0)
NRBC # FLD: 0 K/UL — SIGNIFICANT CHANGE UP (ref 0–0)
PLATELET # BLD AUTO: 242 K/UL — SIGNIFICANT CHANGE UP (ref 150–400)
PLATELET # BLD AUTO: 269 K/UL — SIGNIFICANT CHANGE UP (ref 150–400)
RBC # BLD: 4.65 M/UL — SIGNIFICANT CHANGE UP (ref 3.8–5.2)
RBC # BLD: 4.76 M/UL — SIGNIFICANT CHANGE UP (ref 3.8–5.2)
RBC # FLD: 17.2 % — HIGH (ref 10.3–14.5)
RBC # FLD: 17.2 % — HIGH (ref 10.3–14.5)
RH IG SCN BLD-IMP: POSITIVE — SIGNIFICANT CHANGE UP
SARS-COV-2 IGG+IGM SERPL QL IA: >250 U/ML — HIGH
SARS-COV-2 IGG+IGM SERPL QL IA: POSITIVE
T PALLIDUM AB TITR SER: NEGATIVE — SIGNIFICANT CHANGE UP
WBC # BLD: 6.8 K/UL — SIGNIFICANT CHANGE UP (ref 3.8–10.5)
WBC # BLD: 9.71 K/UL — SIGNIFICANT CHANGE UP (ref 3.8–10.5)
WBC # FLD AUTO: 6.8 K/UL — SIGNIFICANT CHANGE UP (ref 3.8–10.5)
WBC # FLD AUTO: 9.71 K/UL — SIGNIFICANT CHANGE UP (ref 3.8–10.5)

## 2023-04-21 PROCEDURE — 59025 FETAL NON-STRESS TEST: CPT | Mod: 26

## 2023-04-21 PROCEDURE — 59514 CESAREAN DELIVERY ONLY: CPT | Mod: AS,U9

## 2023-04-21 DEVICE — SURGICEL SNOW 2 X 4": Type: IMPLANTABLE DEVICE | Status: FUNCTIONAL

## 2023-04-21 RX ORDER — IBUPROFEN 200 MG
600 TABLET ORAL EVERY 6 HOURS
Refills: 0 | Status: COMPLETED | OUTPATIENT
Start: 2023-04-21 | End: 2024-03-19

## 2023-04-21 RX ORDER — TETANUS TOXOID, REDUCED DIPHTHERIA TOXOID AND ACELLULAR PERTUSSIS VACCINE, ADSORBED 5; 2.5; 8; 8; 2.5 [IU]/.5ML; [IU]/.5ML; UG/.5ML; UG/.5ML; UG/.5ML
0.5 SUSPENSION INTRAMUSCULAR ONCE
Refills: 0 | Status: DISCONTINUED | OUTPATIENT
Start: 2023-04-21 | End: 2023-04-24

## 2023-04-21 RX ORDER — SIMETHICONE 80 MG/1
1 TABLET, CHEWABLE ORAL
Qty: 0 | Refills: 0 | DISCHARGE
Start: 2023-04-21

## 2023-04-21 RX ORDER — FAMOTIDINE 10 MG/ML
20 INJECTION INTRAVENOUS ONCE
Refills: 0 | Status: COMPLETED | OUTPATIENT
Start: 2023-04-21 | End: 2023-04-21

## 2023-04-21 RX ORDER — SODIUM CHLORIDE 9 MG/ML
500 INJECTION, SOLUTION INTRAVENOUS ONCE
Refills: 0 | Status: COMPLETED | OUTPATIENT
Start: 2023-04-21 | End: 2023-04-21

## 2023-04-21 RX ORDER — OXYCODONE HYDROCHLORIDE 5 MG/1
5 TABLET ORAL ONCE
Refills: 0 | Status: DISCONTINUED | OUTPATIENT
Start: 2023-04-21 | End: 2023-04-24

## 2023-04-21 RX ORDER — ACETAMINOPHEN 500 MG
1000 TABLET ORAL ONCE
Refills: 0 | Status: COMPLETED | OUTPATIENT
Start: 2023-04-21 | End: 2023-04-21

## 2023-04-21 RX ORDER — OXYTOCIN 10 UNIT/ML
333.33 VIAL (ML) INJECTION
Qty: 20 | Refills: 0 | Status: DISCONTINUED | OUTPATIENT
Start: 2023-04-21 | End: 2023-04-21

## 2023-04-21 RX ORDER — ALBUTEROL 90 UG/1
2 AEROSOL, METERED ORAL
Refills: 0 | DISCHARGE

## 2023-04-21 RX ORDER — KETOROLAC TROMETHAMINE 30 MG/ML
30 SYRINGE (ML) INJECTION EVERY 6 HOURS
Refills: 0 | Status: DISCONTINUED | OUTPATIENT
Start: 2023-04-21 | End: 2023-04-22

## 2023-04-21 RX ORDER — HEPARIN SODIUM 5000 [USP'U]/ML
10000 INJECTION INTRAVENOUS; SUBCUTANEOUS EVERY 12 HOURS
Refills: 0 | Status: DISCONTINUED | OUTPATIENT
Start: 2023-04-21 | End: 2023-04-24

## 2023-04-21 RX ORDER — ACETAMINOPHEN 500 MG
3 TABLET ORAL
Qty: 0 | Refills: 0 | DISCHARGE
Start: 2023-04-21

## 2023-04-21 RX ORDER — SIMETHICONE 80 MG/1
80 TABLET, CHEWABLE ORAL EVERY 4 HOURS
Refills: 0 | Status: DISCONTINUED | OUTPATIENT
Start: 2023-04-21 | End: 2023-04-24

## 2023-04-21 RX ORDER — SODIUM CHLORIDE 9 MG/ML
500 INJECTION, SOLUTION INTRAVENOUS ONCE
Refills: 0 | Status: DISCONTINUED | OUTPATIENT
Start: 2023-04-21 | End: 2023-04-24

## 2023-04-21 RX ORDER — FEXOFENADINE HCL 30 MG
1 TABLET ORAL
Refills: 0 | DISCHARGE

## 2023-04-21 RX ORDER — ACETAMINOPHEN 500 MG
975 TABLET ORAL
Refills: 0 | Status: DISCONTINUED | OUTPATIENT
Start: 2023-04-21 | End: 2023-04-24

## 2023-04-21 RX ORDER — DIPHENHYDRAMINE HCL 50 MG
25 CAPSULE ORAL EVERY 6 HOURS
Refills: 0 | Status: DISCONTINUED | OUTPATIENT
Start: 2023-04-21 | End: 2023-04-24

## 2023-04-21 RX ORDER — HEPARIN SODIUM 5000 [USP'U]/ML
5000 INJECTION INTRAVENOUS; SUBCUTANEOUS EVERY 12 HOURS
Refills: 0 | Status: DISCONTINUED | OUTPATIENT
Start: 2023-04-21 | End: 2023-04-21

## 2023-04-21 RX ORDER — IBUPROFEN 200 MG
1 TABLET ORAL
Qty: 0 | Refills: 0 | DISCHARGE
Start: 2023-04-21

## 2023-04-21 RX ORDER — CITRIC ACID/SODIUM CITRATE 300-500 MG
30 SOLUTION, ORAL ORAL ONCE
Refills: 0 | Status: COMPLETED | OUTPATIENT
Start: 2023-04-21 | End: 2023-04-21

## 2023-04-21 RX ORDER — MAGNESIUM HYDROXIDE 400 MG/1
30 TABLET, CHEWABLE ORAL
Refills: 0 | Status: DISCONTINUED | OUTPATIENT
Start: 2023-04-21 | End: 2023-04-24

## 2023-04-21 RX ORDER — OXYCODONE HYDROCHLORIDE 5 MG/1
5 TABLET ORAL
Refills: 0 | Status: COMPLETED | OUTPATIENT
Start: 2023-04-21 | End: 2023-04-28

## 2023-04-21 RX ORDER — SODIUM CHLORIDE 9 MG/ML
1000 INJECTION, SOLUTION INTRAVENOUS
Refills: 0 | Status: DISCONTINUED | OUTPATIENT
Start: 2023-04-21 | End: 2023-04-21

## 2023-04-21 RX ORDER — ASPIRIN/CALCIUM CARB/MAGNESIUM 324 MG
1 TABLET ORAL
Refills: 0 | DISCHARGE

## 2023-04-21 RX ORDER — SODIUM CHLORIDE 9 MG/ML
1000 INJECTION, SOLUTION INTRAVENOUS ONCE
Refills: 0 | Status: COMPLETED | OUTPATIENT
Start: 2023-04-21 | End: 2023-04-21

## 2023-04-21 RX ORDER — LANOLIN
1 OINTMENT (GRAM) TOPICAL EVERY 6 HOURS
Refills: 0 | Status: DISCONTINUED | OUTPATIENT
Start: 2023-04-21 | End: 2023-04-24

## 2023-04-21 RX ORDER — SODIUM CHLORIDE 9 MG/ML
1000 INJECTION, SOLUTION INTRAVENOUS ONCE
Refills: 0 | Status: DISCONTINUED | OUTPATIENT
Start: 2023-04-21 | End: 2023-04-24

## 2023-04-21 RX ORDER — ALBUTEROL 90 UG/1
2.5 AEROSOL, METERED ORAL EVERY 6 HOURS
Refills: 0 | Status: DISCONTINUED | OUTPATIENT
Start: 2023-04-21 | End: 2023-04-22

## 2023-04-21 RX ORDER — ALBUTEROL 90 UG/1
1 AEROSOL, METERED ORAL EVERY 4 HOURS
Refills: 0 | Status: DISCONTINUED | OUTPATIENT
Start: 2023-04-21 | End: 2023-04-23

## 2023-04-21 RX ORDER — OXYCODONE HYDROCHLORIDE 5 MG/1
5 TABLET ORAL
Refills: 0 | Status: DISCONTINUED | OUTPATIENT
Start: 2023-04-21 | End: 2023-04-24

## 2023-04-21 RX ADMIN — OXYCODONE HYDROCHLORIDE 5 MILLIGRAM(S): 5 TABLET ORAL at 14:56

## 2023-04-21 RX ADMIN — SODIUM CHLORIDE 125 MILLILITER(S): 9 INJECTION, SOLUTION INTRAVENOUS at 09:28

## 2023-04-21 RX ADMIN — Medication 30 MILLIGRAM(S): at 18:55

## 2023-04-21 RX ADMIN — Medication 400 MILLIGRAM(S): at 13:40

## 2023-04-21 RX ADMIN — SODIUM CHLORIDE 2000 MILLILITER(S): 9 INJECTION, SOLUTION INTRAVENOUS at 09:28

## 2023-04-21 RX ADMIN — Medication 975 MILLIGRAM(S): at 22:11

## 2023-04-21 RX ADMIN — HEPARIN SODIUM 10000 UNIT(S): 5000 INJECTION INTRAVENOUS; SUBCUTANEOUS at 18:40

## 2023-04-21 RX ADMIN — Medication 30 MILLIGRAM(S): at 18:40

## 2023-04-21 RX ADMIN — SODIUM CHLORIDE 75 MILLILITER(S): 9 INJECTION, SOLUTION INTRAVENOUS at 13:19

## 2023-04-21 RX ADMIN — SODIUM CHLORIDE 1000 MILLILITER(S): 9 INJECTION, SOLUTION INTRAVENOUS at 11:55

## 2023-04-21 RX ADMIN — Medication 975 MILLIGRAM(S): at 21:38

## 2023-04-21 RX ADMIN — Medication 1000 MILLIUNIT(S)/MIN: at 13:20

## 2023-04-21 RX ADMIN — Medication 1000 MILLIGRAM(S): at 14:00

## 2023-04-21 RX ADMIN — Medication 30 MILLILITER(S): at 09:27

## 2023-04-21 RX ADMIN — FAMOTIDINE 20 MILLIGRAM(S): 10 INJECTION INTRAVENOUS at 09:27

## 2023-04-21 RX ADMIN — OXYCODONE HYDROCHLORIDE 5 MILLIGRAM(S): 5 TABLET ORAL at 16:25

## 2023-04-21 NOTE — OB RN DELIVERY SUMMARY - NSSELHIDDEN_OBGYN_ALL_OB_FT
[NS_DeliveryAttending1_OBGYN_ALL_OB_FT:[id number]],[NS_DeliveryAssist1_OBGYN_ALL_OB_FT:[id number]],[NS_DeliveryAssist2_OBGYN_ALL_OB_FT:[id number]],[NS_DeliveryRN_OBGYN_ALL_OB_FT:[id number]]

## 2023-04-21 NOTE — OB PROVIDER DELIVERY SUMMARY - NSPROVIDERDELIVERYNOTE_OBGYN_ALL_OB_FT
Multiple uterine fibroids noted, largest R lateral 7cm   Viable male infant, apgar 8/9 wgt 330 gms   galdino breech presentation, clear copious fluid  hysterotomy closed in single layer  surgicel powder placed over hysterotomy    QBL: 935  UOP: 400 Multiple uterine fibroids noted, largest R lateral 7cm   Viable male infant  galdino breech presentation, clear copious fluid  hysterotomy closed in single layer  surgicel powder placed over hysterotomy    QBL: 935  UOP: 400

## 2023-04-21 NOTE — DISCHARGE NOTE OB - PLAN OF CARE
s/p  delivery.  nothing per vagina x 6 weeks, follow-up for incision check in 2 weeks, full post partum visit in 6 weeks.

## 2023-04-21 NOTE — OB PROVIDER H&P - NSLOWPPHRISK_OBGYN_A_OB
No previous uterine incision/Lucio Pregnancy/Less than or equal to 4 previous vaginal births/No known bleeding disorder/No history of postpartum hemorrhage/No other PPH risks indicated

## 2023-04-21 NOTE — DISCHARGE NOTE OB - CARE PLAN
Assessment and plan of treatment:	s/p  delivery.  nothing per vagina x 6 weeks, follow-up for incision check in 2 weeks, full post partum visit in 6 weeks.   1 Principal Discharge DX:	S/P  section  Assessment and plan of treatment:	s/p  delivery.  nothing per vagina x 6 weeks, follow-up for incision check in 2 weeks, full post partum visit in 6 weeks.

## 2023-04-21 NOTE — OB RN PATIENT PROFILE - WEIGHT: PREPREGNANCY IN LBS
----- Message from Jean-Claude Farnsworth MD sent at 6/30/2020  6:12 PM EDT -----  CALL HER KIDNEY FUNCTION IS WORSE, SHE NEEDS TO DRINK MORE LIQUIDS AND COME BACK FOR STAT BMP Monday NEXT WEEK AND UA, AND MICRO ALBUMIN IN URINE   228

## 2023-04-21 NOTE — OB PROVIDER H&P - NS ATTEND AMEND GEN_ALL_CORE FT
OB Attending    Agree with above. P2 @39.2 wks 7cm right lateral fibroid, breech presentation, admitted for primary .    Shortly before surgery, I met with this patient and discussed the planned procedure.  Risks of the procedure were reviewed, including but not limited to bleeding, infection, damage to surrounding organs, possibility for unplanned procedure if complications arise.  All questions were answered.      MD Lucy

## 2023-04-21 NOTE — DISCHARGE NOTE OB - CARE PROVIDER_API CALL
Jazmín Mckenzie)  Obstetrics and Gynecology  [address] [facility], [address]  [place] [address]  Phone: [phone]  Fax: [phone]  Follow Up Time: 2 weeks

## 2023-04-21 NOTE — OB PROVIDER H&P - HISTORY OF PRESENT ILLNESS
@ 39+3 presents for a scheduled CS for breech presentation  denies ctx, lof, vb & endorses +FM  PNC uncomplicated  accepts blood    OBHx: 13 FT  7#2, 17 FT  7#2  GynHx: Fibroids R lateral 7 x 5 x 6  MedHx:   - asthma - denies hospitalizations/intubations, last inhaler > 1 yr ago.   - Hypothyroid - no current synthroid.   - Seasonal allergies  SHx: denies   Meds: PNV, allegra  NKDA  Social: denies     VS  T(C): --  HR: 109 (23 @ 08:20)  BP: 115/73 (23 @ 08:20)  RR: --  SpO2: --    comfortable  abd soft gravid  EFM/[place] pending  breech confirmed, post placenta

## 2023-04-21 NOTE — OB RN PATIENT PROFILE - PRO VDRL INFANT
Spoke with Ms Lucy/Reina(her daughter) who is having pain in the left hip-hurts to lay on the left side.  She has no hx of trauma but sleeps on left side due to AV access on right.  Her YI=021/88 and Glucose is 200-felt to be 2ndary to pain. Sx seem c/w trochanteric bursitis.  I've recommended she avoid sleeping on left side/hip.  I have erx'd in : Tramadol 50mg prn and recommended ok to try Voltaren gel topically.  If no better, will have to refer to Orthopedics.  
negative

## 2023-04-21 NOTE — DISCHARGE NOTE OB - HOSPITAL COURSE
admitted for scheduled C/S for breech presentation  LTCS 4/21/23, viable male infant.  Post-op course uncomplicated.

## 2023-04-21 NOTE — DISCHARGE NOTE OB - PATIENT PORTAL LINK FT
You can access the FollowMyHealth Patient Portal offered by [facility] by registering at the following website: [email or web address]. By joining [facility]’s FollowMyHealth portal, you will also be able to view your health information using other applications (apps) compatible with our system.

## 2023-04-21 NOTE — OB RN INTRAOPERATIVE NOTE - NSSELHIDDEN_OBGYN_ALL_OB_FT
[NS_DeliveryAttending1_OBGYN_ALL_OB_FT:[id number]],[NS_DeliveryAssist1_OBGYN_ALL_OB_FT:[email or web address]],[NS_DeliveryAssist2_OBGYN_ALL_OB_FT:[id number]],[NS_DeliveryRN_OBGYN_ALL_OB_FT:[id number]]

## 2023-04-21 NOTE — OB NEONATOLOGY/PEDIATRICIAN DELIVERY SUMMARY - NSPEDSNEONOTESA_OBGYN_ALL_OB_FT
Called to delivery for breech presentation. 39.3 wk male born via scheduled CS for breech presentation to a 45 y/o  blood type O+ mother. Maternal history of asthma, hypothyroidism (no medications), sickle cell trait, fibroids. PNL -/-/NR/I, GBS - on 3/27. AROM at TOD with clear fluids. Baby emerged vigorous, crying, was w/d/s/s with APGARS of 8/9. Mom plans to initiate breastfeeding, consents Hep B vaccine, consents circumcision. EOS 0.02. Meconium after delivery.    Physical Exam (Post-Delivery)  Gen: NAD; well-appearing  HEENT: NC/AT; anterior fontanelle open and flat; ears and nose clinically patent, normally set; no tags, no cleft palate appreciated  Skin: pink, warm, well-perfused, no rash  Resp: non-labored breathing  Abd: soft, NT/ND; no masses appreciated  Extremities: moving all extremities, no crepitus; hips OB not appreciated; hips/legs not in hyperflexion at rest   MSK: no clavicular fracture appreciated  : Patrice I; no abnormalities; anus patent  Back: no sacral dimple  Neuro: +yanick, +babinski, +grasp, good tone throughout

## 2023-04-21 NOTE — BRIEF OPERATIVE NOTE - OPERATION/FINDINGS
Multiple uterine fibroids noted, largest R lateral 7cm   Viable male infant, apgar 8/9 wgt 330 gms   galdino breech presentation, clear copious fluid  hysterotomy closed in single layer  surgicel powder placed over hysterotomy    QBL: 935  UOP: 400

## 2023-04-21 NOTE — OB PROVIDER DELIVERY SUMMARY - NSMODPPHRISK_OBGYN_A_OB
Large Uterine fibroids/AMA - over 35 years of age Large Uterine fibroids/BMI > 40/AMA - over 35 years of age

## 2023-04-21 NOTE — OB PROVIDER H&P - ASSESSMENT
P2 @ 39+3 presents for primary CS for breech  admit to L&D  routine labs  IV fluids  preop meds  anesthesia consult    alejandra bland

## 2023-04-21 NOTE — OB PROVIDER DELIVERY SUMMARY - NSSELHIDDEN_OBGYN_ALL_OB_FT
[NS_DeliveryAttending1_OBGYN_ALL_OB_FT:[id number]],[NS_DeliveryAssist1_OBGYN_ALL_OB_FT:[id number]],[NS_DeliveryAssist2_OBGYN_ALL_OB_FT:[id number]],[NS_DeliveryRN_OBGYN_ALL_OB_FT:[id number]]

## 2023-04-21 NOTE — DISCHARGE NOTE OB - MATERIALS PROVIDED
within normal limits Vaccinations/[facility]  Screening Program/  Immunization Record/Breastfeeding Log/Breastfeeding Mother’s Support Group Information/Guide to Postpartum Care/Back To Sleep Handout/Shaken Baby Prevention Handout/Breastfeeding Guide and Packet/Birth Certificate Instructions/Tdap Vaccination (VIS Pub Date: 2012)

## 2023-04-21 NOTE — DISCHARGE NOTE OB - MEDICATION SUMMARY - MEDICATIONS TO TAKE
I will START or STAY ON the medications listed below when I get home from the hospital:    ibuprofen 600 mg oral tablet  -- 1 tab(s) by mouth every 6 hours  -- Indication: For pain    acetaminophen 325 mg oral tablet  -- 3 tab(s) by mouth every 6 hours  -- Indication: For pain    simethicone 80 mg oral tablet, chewable  -- 1 tab(s) by mouth every 4 hours As needed Gas  -- Indication: For gas pain

## 2023-04-22 LAB
BASOPHILS # BLD AUTO: 0.02 K/UL — SIGNIFICANT CHANGE UP (ref 0–0.2)
BASOPHILS NFR BLD AUTO: 0.2 % — SIGNIFICANT CHANGE UP (ref 0–2)
EOSINOPHIL # BLD AUTO: 0.06 K/UL — SIGNIFICANT CHANGE UP (ref 0–0.5)
EOSINOPHIL NFR BLD AUTO: 0.5 % — SIGNIFICANT CHANGE UP (ref 0–6)
HCT VFR BLD CALC: 31.4 % — LOW (ref 34.5–45)
HGB BLD-MCNC: 10.2 G/DL — LOW (ref 11.5–15.5)
IANC: 8.63 K/UL — HIGH (ref 1.8–7.4)
IMM GRANULOCYTES NFR BLD AUTO: 0.8 % — SIGNIFICANT CHANGE UP (ref 0–0.9)
LYMPHOCYTES # BLD AUTO: 1.41 K/UL — SIGNIFICANT CHANGE UP (ref 1–3.3)
LYMPHOCYTES # BLD AUTO: 12.3 % — LOW (ref 13–44)
MCHC RBC-ENTMCNC: 26 PG — LOW (ref 27–34)
MCHC RBC-ENTMCNC: 32.5 GM/DL — SIGNIFICANT CHANGE UP (ref 32–36)
MCV RBC AUTO: 80.1 FL — SIGNIFICANT CHANGE UP (ref 80–100)
MONOCYTES # BLD AUTO: 1.3 K/UL — HIGH (ref 0–0.9)
MONOCYTES NFR BLD AUTO: 11.3 % — SIGNIFICANT CHANGE UP (ref 2–14)
NEUTROPHILS # BLD AUTO: 8.63 K/UL — HIGH (ref 1.8–7.4)
NEUTROPHILS NFR BLD AUTO: 74.9 % — SIGNIFICANT CHANGE UP (ref 43–77)
NRBC # BLD: 0 /100 WBCS — SIGNIFICANT CHANGE UP (ref 0–0)
NRBC # FLD: 0 K/UL — SIGNIFICANT CHANGE UP (ref 0–0)
PLATELET # BLD AUTO: 230 K/UL — SIGNIFICANT CHANGE UP (ref 150–400)
RBC # BLD: 3.92 M/UL — SIGNIFICANT CHANGE UP (ref 3.8–5.2)
RBC # FLD: 17.2 % — HIGH (ref 10.3–14.5)
WBC # BLD: 11.51 K/UL — HIGH (ref 3.8–10.5)
WBC # FLD AUTO: 11.51 K/UL — HIGH (ref 3.8–10.5)

## 2023-04-22 RX ORDER — ALBUTEROL 90 UG/1
2.5 AEROSOL, METERED ORAL EVERY 6 HOURS
Refills: 0 | Status: DISCONTINUED | OUTPATIENT
Start: 2023-04-22 | End: 2023-04-23

## 2023-04-22 RX ORDER — IBUPROFEN 200 MG
600 TABLET ORAL EVERY 6 HOURS
Refills: 0 | Status: DISCONTINUED | OUTPATIENT
Start: 2023-04-22 | End: 2023-04-24

## 2023-04-22 RX ADMIN — Medication 975 MILLIGRAM(S): at 04:18

## 2023-04-22 RX ADMIN — Medication 975 MILLIGRAM(S): at 22:00

## 2023-04-22 RX ADMIN — Medication 30 MILLIGRAM(S): at 01:00

## 2023-04-22 RX ADMIN — OXYCODONE HYDROCHLORIDE 5 MILLIGRAM(S): 5 TABLET ORAL at 21:29

## 2023-04-22 RX ADMIN — HEPARIN SODIUM 10000 UNIT(S): 5000 INJECTION INTRAVENOUS; SUBCUTANEOUS at 06:03

## 2023-04-22 RX ADMIN — Medication 600 MILLIGRAM(S): at 12:40

## 2023-04-22 RX ADMIN — Medication 600 MILLIGRAM(S): at 13:10

## 2023-04-22 RX ADMIN — OXYCODONE HYDROCHLORIDE 5 MILLIGRAM(S): 5 TABLET ORAL at 22:00

## 2023-04-22 RX ADMIN — Medication 30 MILLIGRAM(S): at 00:23

## 2023-04-22 RX ADMIN — Medication 975 MILLIGRAM(S): at 09:10

## 2023-04-22 RX ADMIN — Medication 30 MILLIGRAM(S): at 06:57

## 2023-04-22 RX ADMIN — Medication 975 MILLIGRAM(S): at 16:25

## 2023-04-22 RX ADMIN — HEPARIN SODIUM 10000 UNIT(S): 5000 INJECTION INTRAVENOUS; SUBCUTANEOUS at 17:30

## 2023-04-22 RX ADMIN — Medication 600 MILLIGRAM(S): at 18:00

## 2023-04-22 RX ADMIN — Medication 975 MILLIGRAM(S): at 05:00

## 2023-04-22 RX ADMIN — Medication 975 MILLIGRAM(S): at 15:55

## 2023-04-22 RX ADMIN — Medication 975 MILLIGRAM(S): at 21:29

## 2023-04-22 RX ADMIN — Medication 975 MILLIGRAM(S): at 09:40

## 2023-04-22 RX ADMIN — Medication 600 MILLIGRAM(S): at 17:30

## 2023-04-22 RX ADMIN — Medication 30 MILLIGRAM(S): at 06:03

## 2023-04-22 NOTE — PROGRESS NOTE ADULT - SUBJECTIVE AND OBJECTIVE BOX
She is a  43y woman who is now post-operative day: 1    Subjective:  Pt without complaints.  Pain contolled.  +OOB.  Voiding.  Tolerating regular diet.  No nausea/vomiting.  No flatus.  Lochia WNL.    Objective:  Vital Signs Last 24 Hrs  T(C): 36.6 (2023 10:00), Max: 36.8 (2023 02:06)  T(F): 97.9 (2023 10:00), Max: 98.2 (2023 02:06)  HR: 96 (2023 10:00) (70 - 103)  BP: 96/77 (2023 10:00) (82/54 - 125/68)  BP(mean): 89 (2023 16:30) (59 - 103)  RR: 18 (2023 10:00) (14 - 18)  SpO2: 98% (2023 10:00) (97% - 100%)    Parameters below as of 2023 10:00  Patient On (Oxygen Delivery Method): room air        Constitutional: NAD  Abdomen: Soft, non-tender, non-distended.  Uterine fundus firm, non-tender.  Incision: Clean, dry, and intact  Ext: No calf tenderness bilaterally, negative Snow's sign    LABS:                        10.2   11.51 )-----------( 230      ( 2023 05:34 )             31.4                         12.0   9.71  )-----------( 242      ( 2023 13:30 )             37.3                         12.3   6.80  )-----------( 269      ( 2023 08:25 )             38.0     ABO Interpretation: O ( @ 08:46)  Rh Interpretation: Positive ( @ 08:46)  Antibody Screen: Negative ( @ 08:46)  Treponema Pallidum Antibody Interpretation: Negative ( @ 08:25)  ABO Interpretation: O ( @ 19:53)  Rh Interpretation: Positive ( @ 19:53)  Antibody Screen: Negative ( @ 19:53)  ABO Interpretation: O ( @ 18:48)  Rh Interpretation: Positive ( @ 18:48)  Antibody Screen: Negative ( @ 18:48)  Treponema Pallidum Antibody Interpretation: Negative ( @ 18:32)          Allergies    No Known Allergies    Intolerances      MEDICATIONS  (STANDING):  acetaminophen     Tablet .. 975 milliGRAM(s) Oral <User Schedule>  albuterol    0.083% 2.5 milliGRAM(s) Nebulizer every 6 hours  albuterol    90 MICROgram(s) HFA Inhaler 1 Puff(s) Inhalation every 4 hours  diphtheria/tetanus/pertussis (acellular) Vaccine (Adacel) 0.5 milliLiter(s) IntraMuscular once  heparin   Injectable 86190 Unit(s) SubCutaneous every 12 hours  ibuprofen  Tablet. 600 milliGRAM(s) Oral every 6 hours  lactated ringers Bolus 500 milliLiter(s) IV Bolus once  lactated ringers Bolus 1000 milliLiter(s) IV Bolus once    MEDICATIONS  (PRN):  diphenhydrAMINE 25 milliGRAM(s) Oral every 6 hours PRN Pruritus  lanolin Ointment 1 Application(s) Topical every 6 hours PRN Sore Nipples  magnesium hydroxide Suspension 30 milliLiter(s) Oral two times a day PRN Constipation  oxyCODONE    IR 5 milliGRAM(s) Oral once PRN Moderate to Severe Pain (4-10)  oxyCODONE    IR 5 milliGRAM(s) Oral every 3 hours PRN Moderate to Severe Pain (4-10)  simethicone 80 milliGRAM(s) Chew every 4 hours PRN Gas        Assessment and Plan  Pt stable POD #1 s/p  section  -continue routine postoperative and postpartum care  -encourage ambulation  -analgesia prn    MD Lucy

## 2023-04-22 NOTE — CHART NOTE - NSCHARTNOTEFT_GEN_A_CORE
POST OP DAY  1  s/p   SECTION    SUBJECTIVE:  I'm doing great. No pain.    PAIN SCALE SCORE: [x] Refer to charted pain scores    THERAPY:  [ X] Spinal morphine   [  ] Epidural morphine   [  ] IV PCA Hydromorphone 1 mg/ml    OBJECTIVE:  Comfortable Appearing    SEDATION SCORE:	  [ x ] Alert	    [  ] Drowsy        [  ] Arousable	[  ] Asleep	[  ] Unresponsive    Side Effects:	  [ x ] None	     [  ] Nausea        [  ] Pruritus        [  ] Weakness   [  ] Numbness        ASSESSMENT/ PLAN   [   ] Discontinue         [  ] Continue    [ x ] Change to prn Analgesics as per primary service.    DOCUMENTATION & VERIFICATION OF CURRENT MEDS [ x ] Done    COMMENTS: No Headache.

## 2023-04-23 RX ORDER — ALBUTEROL 90 UG/1
2 AEROSOL, METERED ORAL EVERY 6 HOURS
Refills: 0 | Status: DISCONTINUED | OUTPATIENT
Start: 2023-04-23 | End: 2023-04-24

## 2023-04-23 RX ADMIN — Medication 975 MILLIGRAM(S): at 21:12

## 2023-04-23 RX ADMIN — Medication 600 MILLIGRAM(S): at 06:53

## 2023-04-23 RX ADMIN — Medication 975 MILLIGRAM(S): at 16:20

## 2023-04-23 RX ADMIN — Medication 975 MILLIGRAM(S): at 21:42

## 2023-04-23 RX ADMIN — Medication 975 MILLIGRAM(S): at 15:22

## 2023-04-23 RX ADMIN — Medication 600 MILLIGRAM(S): at 12:20

## 2023-04-23 RX ADMIN — Medication 600 MILLIGRAM(S): at 01:14

## 2023-04-23 RX ADMIN — OXYCODONE HYDROCHLORIDE 5 MILLIGRAM(S): 5 TABLET ORAL at 00:31

## 2023-04-23 RX ADMIN — Medication 600 MILLIGRAM(S): at 00:31

## 2023-04-23 RX ADMIN — Medication 600 MILLIGRAM(S): at 11:27

## 2023-04-23 RX ADMIN — SIMETHICONE 80 MILLIGRAM(S): 80 TABLET, CHEWABLE ORAL at 06:20

## 2023-04-23 RX ADMIN — SIMETHICONE 80 MILLIGRAM(S): 80 TABLET, CHEWABLE ORAL at 21:12

## 2023-04-23 RX ADMIN — OXYCODONE HYDROCHLORIDE 5 MILLIGRAM(S): 5 TABLET ORAL at 03:31

## 2023-04-23 RX ADMIN — Medication 975 MILLIGRAM(S): at 04:15

## 2023-04-23 RX ADMIN — SIMETHICONE 80 MILLIGRAM(S): 80 TABLET, CHEWABLE ORAL at 00:31

## 2023-04-23 RX ADMIN — Medication 600 MILLIGRAM(S): at 06:20

## 2023-04-23 RX ADMIN — MAGNESIUM HYDROXIDE 30 MILLILITER(S): 400 TABLET, CHEWABLE ORAL at 06:20

## 2023-04-23 RX ADMIN — OXYCODONE HYDROCHLORIDE 5 MILLIGRAM(S): 5 TABLET ORAL at 01:14

## 2023-04-23 RX ADMIN — HEPARIN SODIUM 10000 UNIT(S): 5000 INJECTION INTRAVENOUS; SUBCUTANEOUS at 06:20

## 2023-04-23 RX ADMIN — Medication 600 MILLIGRAM(S): at 17:28

## 2023-04-23 RX ADMIN — Medication 975 MILLIGRAM(S): at 09:30

## 2023-04-23 RX ADMIN — Medication 600 MILLIGRAM(S): at 18:20

## 2023-04-23 RX ADMIN — Medication 975 MILLIGRAM(S): at 08:36

## 2023-04-23 RX ADMIN — HEPARIN SODIUM 10000 UNIT(S): 5000 INJECTION INTRAVENOUS; SUBCUTANEOUS at 17:29

## 2023-04-23 RX ADMIN — OXYCODONE HYDROCHLORIDE 5 MILLIGRAM(S): 5 TABLET ORAL at 04:15

## 2023-04-23 RX ADMIN — Medication 975 MILLIGRAM(S): at 03:31

## 2023-04-23 NOTE — PROGRESS NOTE ADULT - ASSESSMENT
42y/o  POD#2 from pLTCS for breech, . PMH significant for fibroids. H/H 10.2/31.4. Overall pt recovering well post-operatively.    #Post-op state  - Continue with po analgesia  - Increase ambulation  - Continue regular diet  - No labs  - Incision dressing removed    Haylee Obrien, PGY1 42y/o  POD#2 from pLTCS for breech, . PMH significant for fibroids. H/H 10.2/31.4. Overall pt recovering well post-operatively.    #Post-op state  - Continue with po analgesia  - Increase ambulation  - Continue regular diet  - No labs    Haylee Obrien, PGY1

## 2023-04-23 NOTE — PROGRESS NOTE ADULT - ATTENDING COMMENTS
OBGYN Attending    Pt seen at bedside. Agree with above.  Doing well POD#2.  Pain controlled.  Tolerating PO.  Voiding.  Ambulating.  Abd soft, appropriately tender.  Incision clean/dry/intact.  Routine post-op care.  Discharge planning.    FELIPE Mckenzie MD

## 2023-04-23 NOTE — PROGRESS NOTE ADULT - SUBJECTIVE AND OBJECTIVE BOX
R1 Progress Note    Patient seen and examined at bedside, no acute overnight events. No acute complaints, pain well controlled. Patient is ambulating and tolerating regular diet. Voiding spontaneously and passing flatus. Denies CP, SOB, N/V, HA, blurred vision, epigastric pain.    Vital Signs Last 24 Hours  T(C): 36.7 (04-23-23 @ 06:06), Max: 37.2 (04-22-23 @ 21:41)  HR: 86 (04-23-23 @ 06:06) (86 - 96)  BP: 108/61 (04-23-23 @ 06:06) (96/77 - 125/65)  RR: 18 (04-23-23 @ 06:06) (17 - 18)  SpO2: 99% (04-23-23 @ 06:06) (97% - 99%)    I&O's Summary      Physical Exam:  General: NAD  Abdomen: Soft, non-tender, non-distended, fundus firm  Incision: Pfannenstiel incision CDI, subcuticular suture closure  Pelvic: Lochia wnl    Labs:    Blood Type: O Positive  Antibody Screen: Negative  RPR: Negative               10.2   11.51 )-----------( 230      ( 04-22 @ 05:34 )             31.4                12.0   9.71  )-----------( 242      ( 04-21 @ 13:30 )             37.3                12.3   6.80  )-----------( 269      ( 04-21 @ 08:25 )             38.0         MEDICATIONS  (STANDING):  acetaminophen     Tablet .. 975 milliGRAM(s) Oral <User Schedule>  albuterol    90 MICROgram(s) HFA Inhaler 1 Puff(s) Inhalation every 4 hours  diphtheria/tetanus/pertussis (acellular) Vaccine (Adacel) 0.5 milliLiter(s) IntraMuscular once  heparin   Injectable 84337 Unit(s) SubCutaneous every 12 hours  ibuprofen  Tablet. 600 milliGRAM(s) Oral every 6 hours  lactated ringers Bolus 1000 milliLiter(s) IV Bolus once  lactated ringers Bolus 500 milliLiter(s) IV Bolus once    MEDICATIONS  (PRN):  albuterol    0.083% 2.5 milliGRAM(s) Nebulizer every 6 hours PRN Bronchospasm  diphenhydrAMINE 25 milliGRAM(s) Oral every 6 hours PRN Pruritus  lanolin Ointment 1 Application(s) Topical every 6 hours PRN Sore Nipples  magnesium hydroxide Suspension 30 milliLiter(s) Oral two times a day PRN Constipation  oxyCODONE    IR 5 milliGRAM(s) Oral once PRN Moderate to Severe Pain (4-10)  oxyCODONE    IR 5 milliGRAM(s) Oral every 3 hours PRN Moderate to Severe Pain (4-10)  simethicone 80 milliGRAM(s) Chew every 4 hours PRN Gas

## 2023-04-24 VITALS
RESPIRATION RATE: 18 BRPM | HEART RATE: 88 BPM | OXYGEN SATURATION: 100 % | TEMPERATURE: 98 F | SYSTOLIC BLOOD PRESSURE: 104 MMHG | DIASTOLIC BLOOD PRESSURE: 70 MMHG

## 2023-04-24 RX ADMIN — Medication 600 MILLIGRAM(S): at 12:56

## 2023-04-24 RX ADMIN — Medication 600 MILLIGRAM(S): at 01:26

## 2023-04-24 RX ADMIN — Medication 600 MILLIGRAM(S): at 00:56

## 2023-04-24 RX ADMIN — Medication 975 MILLIGRAM(S): at 08:59

## 2023-04-24 RX ADMIN — Medication 600 MILLIGRAM(S): at 05:37

## 2023-04-24 RX ADMIN — Medication 975 MILLIGRAM(S): at 09:45

## 2023-04-24 RX ADMIN — Medication 600 MILLIGRAM(S): at 06:07

## 2023-04-24 RX ADMIN — MAGNESIUM HYDROXIDE 30 MILLILITER(S): 400 TABLET, CHEWABLE ORAL at 00:56

## 2023-04-24 RX ADMIN — HEPARIN SODIUM 10000 UNIT(S): 5000 INJECTION INTRAVENOUS; SUBCUTANEOUS at 05:37

## 2023-04-24 NOTE — PROGRESS NOTE ADULT - SUBJECTIVE AND OBJECTIVE BOX
S: 44yo POD#3 s/p pLTCS for breech presentation. Pain is well controlled. She is tolerating a regular diet, passing flatus, voiding spontaneously, and ambulating without difficulty. Denies CP, SOB, lightheadedness, dizziness, N/V.    O:  Vital Signs Last 24 Hrs  T(C): 36.6 (24 Apr 2023 06:08), Max: 36.7 (23 Apr 2023 21:48)  T(F): 97.8 (24 Apr 2023 06:08), Max: 98 (23 Apr 2023 21:48)  HR: 79 (24 Apr 2023 06:08) (79 - 88)  BP: 121/82 (24 Apr 2023 06:08) (106/59 - 123/71)  RR: 18 (24 Apr 2023 06:08) (18 - 19)  SpO2: 99% (24 Apr 2023 06:08) (99% - 100%)    Parameters below as of 23 Apr 2023 13:59  Patient On (Oxygen Delivery Method): room air      MEDICATIONS  (STANDING):  acetaminophen     Tablet .. 975 milliGRAM(s) Oral <User Schedule>  diphtheria/tetanus/pertussis (acellular) Vaccine (Adacel) 0.5 milliLiter(s) IntraMuscular once  heparin   Injectable 29894 Unit(s) SubCutaneous every 12 hours  ibuprofen  Tablet. 600 milliGRAM(s) Oral every 6 hours  lactated ringers Bolus 1000 milliLiter(s) IV Bolus once  lactated ringers Bolus 500 milliLiter(s) IV Bolus once      MEDICATIONS  (PRN):  albuterol    90 MICROgram(s) HFA Inhaler 2 Puff(s) Inhalation every 6 hours PRN Shortness of Breath  diphenhydrAMINE 25 milliGRAM(s) Oral every 6 hours PRN Pruritus  lanolin Ointment 1 Application(s) Topical every 6 hours PRN Sore Nipples  magnesium hydroxide Suspension 30 milliLiter(s) Oral two times a day PRN Constipation  oxyCODONE    IR 5 milliGRAM(s) Oral once PRN Moderate to Severe Pain (4-10)  oxyCODONE    IR 5 milliGRAM(s) Oral every 3 hours PRN Moderate to Severe Pain (4-10)  simethicone 80 milliGRAM(s) Chew every 4 hours PRN Gas      Labs:  Blood type: O Positive  Rubella IgG: RPR: Negative                          10.2<L>   11.51<H> >-----------< 230    ( 04-22 @ 05:34 )             31.4<L>                        12.0   9.71 >-----------< 242    ( 04-21 @ 13:30 )             37.3      PE:  General: NAD  Breasts: soft, nontender, non-engorged  Abdomen: Soft, appropriately tender, fundus firm  Incision: C/D/I with Dermabond  Vaginal: Lochia light  Extremities: No edema, erythema, or calf tenderness    A/P: 44yo POD#3 s/p pLTCS for breech presentation. Patient is stable and doing well post-operatively.      -Abdominal binder encouraged  -Continue regular diet  -Increase ambulation  -Continue motrin, tylenol, oxycodone PRN for pain control  -Discharge planning for today    Marie SEGUNDO-BC

## 2023-05-22 NOTE — ED ADULT NURSE NOTE - CARDIO WDL
no rashes , no suspicious lesions , no areas of discoloration Normal rate, regular rhythm, normal S1, S2 heart sounds heard.

## 2024-02-26 NOTE — OB RN PATIENT PROFILE - IN THE PAST 12 MONTHS HAVE YOU USED DRUGS OTHER THAN THOSE REQUIRED FOR MEDICAL REASON?
Radiation Treatment Summary Note      Patient Name: Preethi Carlson  : 1981    Attending Provider: Eliot Aragon MD      Diagnosis:     ICD-10-CM ICD-9-CM   1. Malignant neoplasm of upper-outer quadrant of right breast in female, estrogen receptor positive  C50.411 174.4    Z17.0 V86.0       Radiation Start Date: 2024    Radiation Completion Date: 2024      Prescription:     Sequential    Site: Right breast  Laterality: Right  Total Dose: 4050 cGy  Dose per Fraction: 270 cGy  Total Fractions: 15  Daily or BID: Daily  Modality: Photon  Technique: 3DCRT  Bolus: No       THEN      2.   Site: Breast boost  Laterality: Right  Total Dose: 1000 cGy  Dose per Fraction: 200 cGy  Total Fractions: 5  Daily or BID: Daily  Modality: Photon  Technique: 3DCRT  Bolus: No    Final Delivered Dose Deviated From Initially Prescribed Dose: No    Concurrent Chemotherapy: No    Patient Tolerated Treatment Without Unexpected Side Effects/Complications: Yes    ECOG: Fully active, able to carry on all pre-disease performance without restriction = 0    Pain Management Plan: None Indicated/PRN OTC    Follow-Up Plan: 4-6 weeks    Imaging Ordered for Follow-Up: None/NA        Eliot Aragon MD   No

## 2025-01-21 NOTE — ED ADULT NURSE NOTE - NSFALLRSKPASTHIST_ED_ALL_ED
no Additional Notes: Medicare - I, Dr. Luca Coley, attest that my [employer] accredited resident/fellow physician at the [facility] residency program and myself personally met with the patient face to face and examined the patient and I developed the plan of care. As a teaching physician which involves residents in providing care at [facility], I am able to provide the necessary direction, management and review for the resident’s services by direct supervision with the resident and the patient. Incident to services are being performed under the direct supervision of Dr. Coley, initiation and continued involvement in treatment is always followed and he is immediately available as he is within the walls of this office during treatment to provide assistance and direction throughout the time the resident or fellow is performing services. Detail Level: Generalized Render Risk Assessment In Note?: no
